# Patient Record
Sex: FEMALE | Race: BLACK OR AFRICAN AMERICAN
[De-identification: names, ages, dates, MRNs, and addresses within clinical notes are randomized per-mention and may not be internally consistent; named-entity substitution may affect disease eponyms.]

---

## 2018-11-06 ENCOUNTER — HOSPITAL ENCOUNTER (OUTPATIENT)
Dept: HOSPITAL 62 - OD | Age: 68
End: 2018-11-06
Attending: OTOLARYNGOLOGY
Payer: MEDICARE

## 2018-11-06 DIAGNOSIS — J30.9: Primary | ICD-10-CM

## 2018-11-06 PROCEDURE — 86003 ALLG SPEC IGE CRUDE XTRC EA: CPT

## 2018-11-06 PROCEDURE — 36415 COLL VENOUS BLD VENIPUNCTURE: CPT

## 2018-11-06 PROCEDURE — 82785 ASSAY OF IGE: CPT

## 2019-12-20 NOTE — EKG REPORT
SEVERITY:- BORDERLINE ECG -

SINUS RHYTHM

BORDERLINE T ABNORMALITIES, DIFFUSE LEADS

:

Confirmed by: Cindy Schwartz 20-Dec-2019 15:25:44

## 2019-12-23 ENCOUNTER — HOSPITAL ENCOUNTER (OUTPATIENT)
Dept: HOSPITAL 62 - SC | Age: 69
Discharge: HOME | End: 2019-12-23
Attending: OTOLARYNGOLOGY
Payer: OTHER GOVERNMENT

## 2019-12-23 DIAGNOSIS — Z79.899: ICD-10-CM

## 2019-12-23 DIAGNOSIS — K21.9: ICD-10-CM

## 2019-12-23 DIAGNOSIS — E11.9: ICD-10-CM

## 2019-12-23 DIAGNOSIS — J35.01: ICD-10-CM

## 2019-12-23 DIAGNOSIS — J30.9: ICD-10-CM

## 2019-12-23 DIAGNOSIS — R09.89: ICD-10-CM

## 2019-12-23 DIAGNOSIS — E07.9: ICD-10-CM

## 2019-12-23 DIAGNOSIS — J35.3: Primary | ICD-10-CM

## 2019-12-23 DIAGNOSIS — I10: ICD-10-CM

## 2019-12-23 DIAGNOSIS — Z79.84: ICD-10-CM

## 2019-12-23 DIAGNOSIS — R06.5: ICD-10-CM

## 2019-12-23 DIAGNOSIS — R06.83: ICD-10-CM

## 2019-12-23 DIAGNOSIS — J35.8: ICD-10-CM

## 2019-12-23 DIAGNOSIS — J03.91: ICD-10-CM

## 2019-12-23 DIAGNOSIS — D64.9: ICD-10-CM

## 2019-12-23 DIAGNOSIS — G47.8: ICD-10-CM

## 2019-12-23 DIAGNOSIS — K13.79: ICD-10-CM

## 2019-12-23 PROCEDURE — 88304 TISSUE EXAM BY PATHOLOGIST: CPT

## 2019-12-23 PROCEDURE — 00160 ANES PX NOSE&SINUS NOS: CPT

## 2019-12-23 PROCEDURE — 42145 REPAIR PALATE PHARYNX/UVULA: CPT

## 2019-12-23 PROCEDURE — 93010 ELECTROCARDIOGRAM REPORT: CPT

## 2019-12-23 PROCEDURE — 82962 GLUCOSE BLOOD TEST: CPT

## 2019-12-23 PROCEDURE — 93005 ELECTROCARDIOGRAM TRACING: CPT

## 2019-12-28 NOTE — OPERATIVE REPORT
[facility]


Operative Report: 


DATE OF OPERATION: December 23, 2019





PREOPERATIVE DIAGNOSIS:


1.   Chronic tonsillitis


2.   Snoring disorder


3.   Tonsil stones


4.   Disorder of the uvula


5.   Chronic gagging





POSTOPERATIVE DIAGNOSIS:


1.   Chronic tonsillitis


2.   Snoring disorder


3.   Tonsil stones


4.   Disorder of the uvula


5.   Chronic gagging





PROCEDURE:


1.   Uvulopalatalpharyngoplasty/UP3 and reduction of the uvula


2.   Bilateral tonsillectomy patient age greater than 12





Primary Surgeon of Record: Dr. Sidney Stout


ASSISTANT: None


Anesthesia Staff: KANDI Reddy


ANESTHESIA: General Endotracheal Tube Anesthesia


DRAINS: None


SPONGE COUNT: Verified


Needle Count: N/A


SPECIMEN/MATERIALS FORWARD TO THE LAB:


1.   Left and Right Tonsillar Tissue


ESTIMATED BLOOD LOSS: 10 mL


IV FLUIDS: 700 mL


COMPLICATIONS: None





Findings:


1.   The tonsils were 1-2+ in size and endophytic in nature, they were cryptic 

in appearance, and there were tonsil stones present.


2.   The soft palatal tissues were significantly redundant in nature and the 

uvula was significantly elongated and thickened and was noted to be resting on 

the patient's base of tongue area, and the uvula could almost be extended 

outside of the patient's mouth.





INDICATIONS:


This is a 69-year-old Afro-American female who was seen and evaluated in the 

[facility]. The patient had been referred for and complained 

of loud consistent excessive snoring over the years despite 2 sleep studies with

no sleep apnea noted.  The patient also complains of excessive length of her 

uvula which causes chronic gagging over the years and the uvula will also swell 

intermittently over the years causing her to be very uncomfortable.  After 

extensive discussion with the patient the recommendation and plan was to proceed

with a bilateral tonsillectomy, uvulopalatopharyngoplasty/UP3, and reduction of 

the uvula. The procedures and all of the risks and complications were all 

discussed in detail with the patient. She voiced an understanding of the 

described surgical plan, were in agreement, and consent was obtained.





DESCRIPTION OF OPERATIVE PROCEDURE:  The patient was taken to the main operating

room and was placed on the operating room table in the supine position. 

Appropriate monitors were placed. Using mask and IV access general anesthesia 

was induced. The patient was next transorally intubated without difficulty. The 

table was then rotated 90 and the patient was positioned and prepped for tonsil

and adenoid surgery. The lips, teeth, tongue, and gums were inspected and noted 

to be without defect. The patient had a mouth gag inserted. It was opened and 

the patient was placed into suspension. There was a soft catheter passed through

the nose that was used to suspend the soft palate. Findings are as noted above. 

At this point Marcaine with epinephrine was administered into the peritonsillar 

regions.  The plasma J-hook device was used to dissect and remove the tonsils 

from the tonsillar fossae without difficulty. This was also used to provide 

adequate hemostasis.  At this point the posterior tonsillar pillars were 

released from the soft palatal area with excessive tissue trimmed by making 

lateral soft palate incisions less than 1 cm in length.  At this point 4-0 

chromic suture was used to reapproximate tissues.  Next, the distal aspect of 

the uvula was removed as well as excessive mucosa and 5-0 chromic suture was 

used to reapproximate mucosal margins of the uvula.  Normal saline irrigation 

was performed and was suctioned. Adequate hemostasis was noted. The soft ca

theter was released and removed from the patients nose. The patient was next 

released from suspension and the mouth gag was closed. It was opened again and 

there was again no bleeding noted. It was then removed from the patient's mouth 

without difficulty. There was no damage to the lips, teeth, tongue, or gums 

noted. The patient was then returned to the anesthesia staff and was allowed to 

emerge from general anesthesia. The patient was extubated in the operating room 

and was transported to the post anesthesia recovery unit in stable condition. 

There were no complications.